# Patient Record
Sex: FEMALE | Race: BLACK OR AFRICAN AMERICAN | Employment: FULL TIME | URBAN - METROPOLITAN AREA
[De-identification: names, ages, dates, MRNs, and addresses within clinical notes are randomized per-mention and may not be internally consistent; named-entity substitution may affect disease eponyms.]

---

## 2017-09-26 ENCOUNTER — OFFICE VISIT (OUTPATIENT)
Dept: URGENT CARE | Facility: URGENT CARE | Age: 27
End: 2017-09-26
Payer: COMMERCIAL

## 2017-09-26 VITALS
BODY MASS INDEX: 34.02 KG/M2 | SYSTOLIC BLOOD PRESSURE: 154 MMHG | WEIGHT: 214 LBS | OXYGEN SATURATION: 95 % | TEMPERATURE: 98.8 F | HEART RATE: 90 BPM | DIASTOLIC BLOOD PRESSURE: 104 MMHG

## 2017-09-26 DIAGNOSIS — R30.0 DYSURIA: ICD-10-CM

## 2017-09-26 DIAGNOSIS — M25.561 ACUTE PAIN OF RIGHT KNEE: ICD-10-CM

## 2017-09-26 DIAGNOSIS — K52.9 GASTROENTERITIS: Primary | ICD-10-CM

## 2017-09-26 LAB
ALBUMIN UR-MCNC: NEGATIVE MG/DL
APPEARANCE UR: CLEAR
BACTERIA #/AREA URNS HPF: ABNORMAL /HPF
BILIRUB UR QL STRIP: NEGATIVE
COLOR UR AUTO: YELLOW
GLUCOSE UR STRIP-MCNC: NEGATIVE MG/DL
HGB UR QL STRIP: NEGATIVE
KETONES UR STRIP-MCNC: NEGATIVE MG/DL
LEUKOCYTE ESTERASE UR QL STRIP: NEGATIVE
NITRATE UR QL: NEGATIVE
NON-SQ EPI CELLS #/AREA URNS LPF: ABNORMAL /LPF
PH UR STRIP: 7 PH (ref 5–7)
RBC #/AREA URNS AUTO: ABNORMAL /HPF
SOURCE: ABNORMAL
SP GR UR STRIP: 1.01 (ref 1–1.03)
UROBILINOGEN UR STRIP-ACNC: 0.2 EU/DL (ref 0.2–1)
WBC #/AREA URNS AUTO: ABNORMAL /HPF

## 2017-09-26 PROCEDURE — 99203 OFFICE O/P NEW LOW 30 MIN: CPT | Performed by: PHYSICIAN ASSISTANT

## 2017-09-26 PROCEDURE — 81001 URINALYSIS AUTO W/SCOPE: CPT | Performed by: PHYSICIAN ASSISTANT

## 2017-09-26 RX ORDER — ONDANSETRON 4 MG/1
4 TABLET, FILM COATED ORAL EVERY 8 HOURS PRN
Qty: 18 TABLET | Refills: 0 | Status: SHIPPED | OUTPATIENT
Start: 2017-09-26

## 2017-09-26 RX ORDER — NAPROXEN 500 MG/1
500 TABLET ORAL 2 TIMES DAILY WITH MEALS
Qty: 60 TABLET | Refills: 0 | Status: SHIPPED | OUTPATIENT
Start: 2017-09-26

## 2017-09-26 NOTE — MR AVS SNAPSHOT
"              After Visit Summary   9/26/2017    Celsa Clay    MRN: [id number]           Patient Information     Date Of Birth          1990        Visit Information        Provider Department      9/26/2017 7:10 PM Ankita Willingham PA-C [facility]        Today's Diagnoses     Gastroenteritis    -  1    Dysuria        Acute pain of right knee          Care Instructions    (K52.9) Gastroenteritis  (primary encounter diagnosis)  Comment:   Plan: ondansetron (ZOFRAN) 4 MG tablet        [place] diet.  Follow up with primary clinic should symptoms persist or worsen.    (R30.0) Dysuria  Comment:   Plan: UA with Microscopic reflex to Culture            (M25.561) Acute pain of right knee  Comment:   Plan:naproxen (NAPROSYN) 500 MG tablet        Ace wrap as needed.                Follow-ups after your visit        Who to contact     If you have questions or need follow up information about today's clinic visit or your schedule please contact [facility] directly at [phone].  Normal or non-critical lab and imaging results will be communicated to you by [email or web address], letter or phone within 4 business days after the clinic has received the results. If you do not hear from us within 7 days, please contact the clinic through [email or web address] or phone. If you have a critical or abnormal lab result, we will notify you by phone as soon as possible.  Submit refill requests through OneAway or call your pharmacy and they will forward the refill request to us. Please allow 3 business days for your refill to be completed.          Additional Information About Your Visit        MyChart Information     OneAway lets you send messages to your doctor, view your test results, renew your prescriptions, schedule appointments and more. To sign up, go to [email or web address] . Click on \"Log in\" on the left side of the screen, which will take you to the Welcome page. Then " "click on \"Sign up Now\" on the right side of the page.     You will be asked to enter the access code listed below, as well as some personal information. Please follow the directions to create your username and password.     Your access code is: [email or web address]  Expires: 2017  8:29 PM     Your access code will  in 90 days. If you need help or a new code, please call your [place] clinic or [phone].        Care EveryWhere ID     This is your Care EveryWhere ID. This could be used by other organizations to access your [place] medical records  [id number]        Your Vitals Were     Pulse Temperature Last Period Pulse Oximetry Breastfeeding? BMI (Body Mass Index)    90 98.8  F (37.1  C) (Oral) 2017 95% No 34.02 kg/m2       Blood Pressure from Last 3 Encounters:   17 (!) 154/104   14 132/79   11 137/76    Weight from Last 3 Encounters:   17 214 lb (97.1 kg)   11 209 lb 11.2 oz (95.1 kg)              We Performed the Following     UA with Microscopic reflex to Culture          Today's Medication Changes          These changes are accurate as of: 17  8:29 PM.  If you have any questions, ask your nurse or doctor.               Start taking these medicines.        Dose/Directions    naproxen 500 MG tablet   Commonly known as:  NAPROSYN   Used for:  Acute pain of right knee   Started by:  Ankita Willingham PA-C        Dose:  500 mg   Take 1 tablet (500 mg) by mouth 2 times daily (with meals)   Quantity:  60 tablet   Refills:  0         These medicines have changed or have updated prescriptions.        Dose/Directions    * ZOFRAN PO   This may have changed:  Another medication with the same name was added. Make sure you understand how and when to take each.        Dose:  4 mg   Take 4 mg by mouth every 6 hours as needed   Refills:  0       * ondansetron 4 MG tablet   Commonly known as:  ZOFRAN   This may have changed:  You were already taking a medication with " the same name, and this prescription was added. Make sure you understand how and when to take each.   Used for:  Gastroenteritis   Changed by:  Ankita Willingham PA-C        Dose:  4 mg   Take 1 tablet (4 mg) by mouth every 8 hours as needed   Quantity:  18 tablet   Refills:  0       * Notice:  This list has 2 medication(s) that are the same as other medications prescribed for you. Read the directions carefully, and ask your doctor or other care provider to review them with you.         Where to get your medicines      These medications were sent to [facility] [id number] [place] [address] [address] AT [facility] [address]  [address], [facility] [address]    Hours:  24-hours Phone:  [phone]     naproxen 500 MG tablet    ondansetron 4 MG tablet                Primary Care Provider    None Specified       No primary provider on file.        Equal Access to Services     [facility] : Hadii olga camposo Sorachell, waaxda luqadaha, qaybta kaalmada adeegyada, akiko sandhu haynayeli rutledge . So [facility] [phone].    ATENCIÓN: Si habla español, tiene a farfan disposición servicios gratuitos de asistencia lingüística. Llame al [phone].    We comply with applicable federal civil rights laws and [place] laws. We do not discriminate on the basis of race, color, national origin, age, disability sex, sexual orientation or gender identity.            Thank you!     Thank you for choosing [facility]  for your care. Our goal is always to provide you with excellent care. Hearing back from our patients is one way we can continue to improve our services. Please take a few minutes to complete the written survey that you may receive in the mail after your visit with us. Thank you!             Your Updated Medication List - Protect others around you: Learn how to safely use, store and throw away your medicines at www.disposemymeds.org.          This list  is accurate as of: 9/26/17  8:29 PM.  Always use your most recent med list.                   Brand Name Dispense Instructions for use Diagnosis    amLODIPine 10 MG tablet    NORVASC    90 tablet    Take 1 tablet by mouth daily.    Hypertension       buPROPion 300 MG 24 hr tablet    WELLBUTRIN XL    90 tablet    Take 1 tablet by mouth every morning.    Tobacco use disorder, Mild major depression (H)       naproxen 500 MG tablet    NAPROSYN    60 tablet    Take 1 tablet (500 mg) by mouth 2 times daily (with meals)    Acute pain of right knee       omeprazole 20 MG tablet     90 tablet    Take 20 mg by mouth daily.    Epigastric pain       polyethylene glycol powder    MIRALAX    510 g    Take 17 g by mouth daily.    Constipation       prenatal multivitamin plus iron 27-0.8 MG Tabs per tablet      Take 1 tablet by mouth daily        * ZOFRAN PO      Take 4 mg by mouth every 6 hours as needed        * ondansetron 4 MG tablet    ZOFRAN    18 tablet    Take 1 tablet (4 mg) by mouth every 8 hours as needed    Gastroenteritis       * Notice:  This list has 2 medication(s) that are the same as other medications prescribed for you. Read the directions carefully, and ask your doctor or other care provider to review them with you.

## 2017-09-26 NOTE — LETTER
[facility]  [facility]    [address] [address]  Phone: [phone]    September 26, 2017      RE: Celsa Clay  [address] [place] [id number]       To whom it may concern:    Celsa Clay was seen in our clinic today. She  may return to work with the following: No restrictions on or about 09/28/2017.    Sincerely,      Ankita Lau PA-C

## 2017-09-27 NOTE — PATIENT INSTRUCTIONS
(K52.9) Gastroenteritis  (primary encounter diagnosis)  Comment:   Plan: ondansetron (ZOFRAN) 4 MG tablet        [place] diet.  Follow up with primary clinic should symptoms persist or worsen.    (R30.0) Dysuria  Comment:   Plan: UA with Microscopic reflex to Culture            (M25.561) Acute pain of right knee  Comment:   Plan:naproxen (NAPROSYN) 500 MG tablet        Ace wrap as needed.

## 2017-09-27 NOTE — NURSING NOTE
"Chief Complaint   Patient presents with     Urgent Care     pt states stomach pain, diarrhea, dehydrated, headache x this morning      Knee Pain     pt states right knee pain and fires sensation 3x days        Initial BP (!) 154/104  Pulse 90  Temp 98.8  F (37.1  C) (Oral)  Wt 214 lb (97.1 kg)  SpO2 95%  BMI 34.02 kg/m2 Estimated body mass index is 34.02 kg/(m^2) as calculated from the following:    Height as of 9/22/11: 5' 6.5\" (1.689 m).    Weight as of this encounter: 214 lb (97.1 kg).  Medication Reconciliation: complete    "

## 2017-09-27 NOTE — PROGRESS NOTES
SUBJECTIVE:   Celsa is a 27 year old female  who is here for:  1) diarrhea.Onset was this morning.,  She has had 5 episodes.  No blood.   Ate tacos last night for dinner.  No ill contacts.    Some nausea, no vomiting.    NO fevers.   Mild headache.      No one else in the family has symptoms NO  Has patient ate outside the home or ate left overs NO  Has patient been on antibiotics NO    Denies any runny nose, congestion or cough  Denies any urinary frequency or dysuria    2) Right knee pain for 3 days.  No trauma.    No history of knee pain.   Pain is worse with going up and down stairs.          Current Outpatient Prescriptions:      buPROPion (WELLBUTRIN XL) 300 MG 24 hr tablet, Take 1 tablet by mouth every morning., Disp: 90 tablet, Rfl: 1     Prenatal Vit-Fe Fumarate-FA (PRENATAL MULTIVITAMIN  PLUS IRON) 27-0.8 MG TABS, Take 1 tablet by mouth daily, Disp: , Rfl:      Ondansetron HCl (ZOFRAN PO), Take 4 mg by mouth every 6 hours as needed, Disp: , Rfl:      polyethylene glycol (MIRALAX) powder, Take 17 g by mouth daily. (Patient not taking: Reported on 9/26/2017), Disp: 510 g, Rfl: 1     amLODIPine (NORVASC) 10 MG tablet, Take 1 tablet by mouth daily. (Patient not taking: Reported on 9/26/2017), Disp: 90 tablet, Rfl: 1     Omeprazole 20 MG tablet, Take 20 mg by mouth daily. (Patient not taking: Reported on 9/26/2017), Disp: 90 tablet, Rfl: 1    No Known Allergies    Past Medical History:   Diagnosis Date     Hypertension     preeclampsia with first         REVIEW OF SYSTEMS  CONSTITUTIONAL:NEGATIVE  ENT/MOUTH: NEGATIVE  RESP: NEGATIVE  CV: NEGATIVE  GI: as per HPI  : NEGATIVE  MUSCULOSKELATAL: NEGATIVE  INTEGUMENTARY/SKIN: NEGATIVE    OBJECTIVE:    BP (!) 154/104  Pulse 90  Temp 98.8  F (37.1  C) (Oral)  Wt 214 lb (97.1 kg)  SpO2 95%  BMI 34.02 kg/m2  GENERAL: healthy, no distress    SKIN: Skin color, texture, turgor normal. No rashes or lesions.    HEENT: extraocular movements are intact, pupils  equal and reactive to light and accommodation, TMs clear, oropharynx clear, nasal passages clear; not tender    NECK: supple    HEART: regular rate and rhythm and no murmurs, clicks, or gallops    LUNGS: clear to auscultation    ABDOMEN:  positive bowel sounds, soft, nontender      (K52.9) Gastroenteritis  (primary encounter diagnosis)  Comment:   Plan: ondansetron (ZOFRAN) 4 MG tablet        [place] diet.  Follow up with primary clinic should symptoms persist or worsen.    (R30.0) Dysuria  Comment:   Plan: UA with Microscopic reflex to Culture            (M25.561) Acute pain of right knee  Comment:   Plan:naproxen (NAPROSYN) 500 MG tablet        Ace wrap as needed.

## 2019-08-17 ENCOUNTER — HOSPITAL ENCOUNTER (EMERGENCY)
Facility: CLINIC | Age: 29
Discharge: HOME | End: 2019-08-17
Attending: EMERGENCY MEDICINE | Admitting: EMERGENCY MEDICINE
Payer: COMMERCIAL

## 2019-08-17 VITALS
SYSTOLIC BLOOD PRESSURE: 138 MMHG | HEIGHT: 67 IN | WEIGHT: 195 LBS | BODY MASS INDEX: 30.61 KG/M2 | OXYGEN SATURATION: 100 % | DIASTOLIC BLOOD PRESSURE: 87 MMHG | TEMPERATURE: 97.9 F | RESPIRATION RATE: 16 BRPM

## 2019-08-17 DIAGNOSIS — N76.0 BACTERIAL VAGINOSIS: ICD-10-CM

## 2019-08-17 DIAGNOSIS — B96.89 BACTERIAL VAGINOSIS: ICD-10-CM

## 2019-08-17 LAB
ALBUMIN UR-MCNC: 10 MG/DL
APPEARANCE UR: CLEAR
BILIRUB UR QL STRIP: NEGATIVE
COLOR UR AUTO: YELLOW
GLUCOSE UR STRIP-MCNC: NEGATIVE MG/DL
HGB UR QL STRIP: NEGATIVE
KETONES UR STRIP-MCNC: NEGATIVE MG/DL
LEUKOCYTE ESTERASE UR QL STRIP: ABNORMAL
MUCOUS THREADS #/AREA URNS LPF: PRESENT /LPF
NITRATE UR QL: NEGATIVE
PH UR STRIP: 6.5 PH (ref 5–7)
RBC #/AREA URNS AUTO: <1 /HPF (ref 0–2)
SOURCE: ABNORMAL
SP GR UR STRIP: 1.03 (ref 1–1.03)
SPECIMEN SOURCE: ABNORMAL
SQUAMOUS #/AREA URNS AUTO: 1 /HPF (ref 0–1)
UROBILINOGEN UR STRIP-MCNC: NORMAL MG/DL (ref 0–2)
WBC #/AREA URNS AUTO: <1 /HPF (ref 0–5)
WET PREP SPEC: ABNORMAL

## 2019-08-17 PROCEDURE — 87591 N.GONORRHOEAE DNA AMP PROB: CPT | Performed by: EMERGENCY MEDICINE

## 2019-08-17 PROCEDURE — 99284 EMERGENCY DEPT VISIT MOD MDM: CPT

## 2019-08-17 PROCEDURE — 87210 SMEAR WET MOUNT SALINE/INK: CPT | Performed by: EMERGENCY MEDICINE

## 2019-08-17 PROCEDURE — 81001 URINALYSIS AUTO W/SCOPE: CPT | Performed by: EMERGENCY MEDICINE

## 2019-08-17 PROCEDURE — 87491 CHLMYD TRACH DNA AMP PROBE: CPT | Performed by: EMERGENCY MEDICINE

## 2019-08-17 RX ORDER — METRONIDAZOLE 500 MG/1
500 TABLET ORAL 4 TIMES DAILY
Qty: 28 TABLET | Refills: 0 | Status: SHIPPED | OUTPATIENT
Start: 2019-08-17 | End: 2019-08-24

## 2019-08-17 ASSESSMENT — MIFFLIN-ST. JEOR: SCORE: 1642.14

## 2019-08-17 NOTE — ED AVS SNAPSHOT
Emergency Department  [address] [place] [address]  Phone:  [phone]  Fax:  [phone]                                    Celsa Clay   MRN: [id number]    Department:   Emergency Department   Date of Visit:  8/17/2019           After Visit Summary Signature Page    I have received my discharge instructions, and my questions have been answered. I have discussed any challenges I see with this plan with the nurse or doctor.    ..........................................................................................................................................  Patient/Patient Representative Signature      ..........................................................................................................................................  Patient Representative Print Name and Relationship to Patient    ..................................................               ................................................  Date                                   Time    ..........................................................................................................................................  Reviewed by Signature/Title    ...................................................              ..............................................  Date                                               Time          22EPIC Rev 08/18

## 2019-08-18 LAB
C TRACH DNA SPEC QL NAA+PROBE: NEGATIVE
N GONORRHOEA DNA SPEC QL NAA+PROBE: NEGATIVE
SPECIMEN SOURCE: NORMAL
SPECIMEN SOURCE: NORMAL

## 2019-08-18 NOTE — ED PROVIDER NOTES
"  History     Chief Complaint:  Vaginal Itching    HPI   Celsa Clay is a 29 year old female who presents to the ED for evaluation of vaginal itching. The patient reports experiencing vaginal itching, discharge and odor for the past three weeks. She reports that her last time having intercourse was about 3 weeks ago as well. The patient states that she thought she could solve this problem on her own by drinking water and cranberry juice, but her symptoms did not improve prompting her presentation to the ED. The patient denies any antibiotic use.    Allergies:  The patient has no known drug allergies.    Medications:    Norvasc  Wellbutrin  Naprosyn   Omeprazole  Zofran  Miralax  Prenatal vitamins    Past Medical History:    Hypertension  Tenosynovitis  Tobacco use disorder  Mild major depression    Past Surgical History:     Section    Family History:    Hypertension, mother and father  Diabetes, mother  Seizures, sister    Social History:  Former smoker.  Negative for alcohol use.  Negative for drug use.  Marital Status:  Single [1]     Review of Systems   Genitourinary: Positive for vaginal discharge.        Vaginal itching and odor   All other systems reviewed and are negative.        Physical Exam     Patient Vitals for the past 24 hrs:   BP Temp Temp src Heart Rate Resp SpO2 Height Weight   19 2148 138/87 97.9  F (36.6  C) Oral 86 16 100 % 1.702 m (5' 7\") 88.5 kg (195 lb)     Physical Exam  Vitals: reviewed by me  General: Pt seen on [facility], cooperative, and alert to conversation  Eyes: Tracking well, clear conjunctiva BL  ENT: MMM, midline trachea.   Lungs: No tachypnea, no accessory muscle use. No respiratory distress.   CV: Rate as above, regular rhythm.    Abd: Soft, non tender, no guarding, no rebound. Non distended  Pelvic exam done with ER chaperone-normal external exam, does have a significant amount of vaginal discharge coming out of the cervix.  No CMT, no " lesions noted.  MSK: no peripheral edema or joint effusion.  No evidence of trauma  Skin: No rash, normal turgor and temperature  Neuro: Clear speech and no facial droop.  Psych: Not RIS, no e/o AH/VH      Emergency Department Course   Laboratory:  UA with Microscopic: Protein albumin: 10, Leukocyte esterase: small, Mucous: present, o/w WNL    Wet prep: clue cells seen  Chlamydia trachomatis PCR: pending  Neisseria gonorrhoea PCR: pending    Emergency Department Course:  Nursing notes and vitals reviewed. (6522) I performed an exam of the patient as documented above.     IV inserted. Medicine administered as documented above. Blood drawn. Urine collected. This was sent to the lab for further testing, results above.     3793 I rechecked the patient and discussed the results of her workup thus far.     Findings and plan explained to the Patient. Patient discharged home with instructions regarding supportive care, medications, and reasons to return. The importance of close follow-up was reviewed. The patient was prescribed Metronidazole.    Impression & Plan    Medical Decision Making:  Celsa Clay is a 29 year old female who presents to the Emergency Room with what appears to be bacterial vaginosis. Patient certainly is at risk for gonorrhoea and chlamydia, but stated she does use protection, and without a compelling alternative diagnosis. Will give Flagyl and have patient follow with her primary care doctor in the coming days. No evidence of PID, no cervicitis on my exam, no cervical motion tenderness, will discharge as above.    Diagnosis:    ICD-10-CM   1. Bacterial vaginosis N76.0    B96.89       Disposition:  The patient was discharged to home.    Discharge Medications:  New Prescriptions    METRONIDAZOLE (FLAGYL) 500 MG TABLET    Take 1 tablet (500 mg) by mouth 4 times daily for 7 days     Scribe Disclosure:  INicole, am serving as a scribe on 8/17/2019 at 10:07 PM to personally document  services performed by Brayan Gayle MD based on my observations and the provider's statements to me.     Nicole Shrestha  8/17/2019    EMERGENCY DEPARTMENT       Brayan Gayle MD  08/18/19 0351

## 2022-02-08 ENCOUNTER — LAB REQUISITION (OUTPATIENT)
Dept: LAB | Facility: CLINIC | Age: 32
End: 2022-02-08

## 2022-02-08 PROCEDURE — 86706 HEP B SURFACE ANTIBODY: CPT | Performed by: INTERNAL MEDICINE

## 2022-02-08 PROCEDURE — 86481 TB AG RESPONSE T-CELL SUSP: CPT | Performed by: INTERNAL MEDICINE

## 2022-02-08 PROCEDURE — 86787 VARICELLA-ZOSTER ANTIBODY: CPT | Performed by: INTERNAL MEDICINE

## 2022-02-09 LAB
HBV SURFACE AB SERPL IA-ACNC: >1000 M[IU]/ML
VZV IGG SER QL IA: 876.2 INDEX
VZV IGG SER QL IA: POSITIVE

## 2022-02-10 LAB
GAMMA INTERFERON BACKGROUND BLD IA-ACNC: 0.05 IU/ML
M TB IFN-G BLD-IMP: NEGATIVE
M TB IFN-G CD4+ BCKGRND COR BLD-ACNC: 4.61 IU/ML
MITOGEN IGNF BCKGRD COR BLD-ACNC: 0 IU/ML
MITOGEN IGNF BCKGRD COR BLD-ACNC: 0 IU/ML
QUANTIFERON MITOGEN: 4.66 IU/ML
QUANTIFERON NIL TUBE: 0.05 IU/ML
QUANTIFERON TB1 TUBE: 0.05 IU/ML
QUANTIFERON TB2 TUBE: 0.05

## 2022-03-14 ENCOUNTER — APPOINTMENT (OUTPATIENT)
Dept: GENERAL RADIOLOGY | Facility: CLINIC | Age: 32
End: 2022-03-14
Attending: EMERGENCY MEDICINE
Payer: COMMERCIAL

## 2022-03-14 ENCOUNTER — HOSPITAL ENCOUNTER (EMERGENCY)
Facility: CLINIC | Age: 32
Discharge: HOME | End: 2022-03-14
Attending: EMERGENCY MEDICINE | Admitting: EMERGENCY MEDICINE
Payer: COMMERCIAL

## 2022-03-14 VITALS
SYSTOLIC BLOOD PRESSURE: 167 MMHG | TEMPERATURE: 97.2 F | OXYGEN SATURATION: 100 % | RESPIRATION RATE: 16 BRPM | WEIGHT: 213.6 LBS | BODY MASS INDEX: 33.45 KG/M2 | HEART RATE: 78 BPM | DIASTOLIC BLOOD PRESSURE: 117 MMHG

## 2022-03-14 DIAGNOSIS — D64.9 ANEMIA, UNSPECIFIED TYPE: ICD-10-CM

## 2022-03-14 DIAGNOSIS — R06.02 SOB (SHORTNESS OF BREATH): Primary | ICD-10-CM

## 2022-03-14 DIAGNOSIS — R06.00 DYSPNEA, UNSPECIFIED TYPE: ICD-10-CM

## 2022-03-14 DIAGNOSIS — F41.9 ANXIETY: ICD-10-CM

## 2022-03-14 LAB
ANION GAP SERPL CALCULATED.3IONS-SCNC: 6 MMOL/L (ref 3–14)
BASOPHILS # BLD AUTO: 0 10E3/UL (ref 0–0.2)
BASOPHILS NFR BLD AUTO: 0 %
BUN SERPL-MCNC: 11 MG/DL (ref 7–30)
CALCIUM SERPL-MCNC: 8.2 MG/DL (ref 8.5–10.1)
CHLORIDE BLD-SCNC: 110 MMOL/L (ref 94–109)
CO2 SERPL-SCNC: 23 MMOL/L (ref 20–32)
CREAT SERPL-MCNC: 0.68 MG/DL (ref 0.52–1.04)
EOSINOPHIL # BLD AUTO: 0.1 10E3/UL (ref 0–0.7)
EOSINOPHIL NFR BLD AUTO: 2 %
ERYTHROCYTE [DISTWIDTH] IN BLOOD BY AUTOMATED COUNT: 20.1 % (ref 10–15)
FLUAV RNA SPEC QL NAA+PROBE: NEGATIVE
FLUBV RNA RESP QL NAA+PROBE: NEGATIVE
GFR SERPL CREATININE-BSD FRML MDRD: >90 ML/MIN/1.73M2
GLUCOSE BLD-MCNC: 82 MG/DL (ref 70–99)
HCT VFR BLD AUTO: 25.7 % (ref 35–47)
HGB BLD-MCNC: 7.5 G/DL (ref 11.7–15.7)
IMM GRANULOCYTES # BLD: 0 10E3/UL
IMM GRANULOCYTES NFR BLD: 1 %
LYMPHOCYTES # BLD AUTO: 2.1 10E3/UL (ref 0.8–5.3)
LYMPHOCYTES NFR BLD AUTO: 48 %
MCH RBC QN AUTO: 20.3 PG (ref 26.5–33)
MCHC RBC AUTO-ENTMCNC: 29.2 G/DL (ref 31.5–36.5)
MCV RBC AUTO: 70 FL (ref 78–100)
MONOCYTES # BLD AUTO: 0.3 10E3/UL (ref 0–1.3)
MONOCYTES NFR BLD AUTO: 7 %
NEUTROPHILS # BLD AUTO: 1.8 10E3/UL (ref 1.6–8.3)
NEUTROPHILS NFR BLD AUTO: 42 %
NRBC # BLD AUTO: 0 10E3/UL
NRBC BLD AUTO-RTO: 0 /100
NT-PROBNP SERPL-MCNC: 98 PG/ML (ref 0–450)
PLATELET # BLD AUTO: 285 10E3/UL (ref 150–450)
POTASSIUM BLD-SCNC: 3.6 MMOL/L (ref 3.4–5.3)
RBC # BLD AUTO: 3.69 10E6/UL (ref 3.8–5.2)
SARS-COV-2 RNA RESP QL NAA+PROBE: NEGATIVE
SODIUM SERPL-SCNC: 139 MMOL/L (ref 133–144)
TROPONIN I SERPL HS-MCNC: <3 NG/L
WBC # BLD AUTO: 4.3 10E3/UL (ref 4–11)

## 2022-03-14 PROCEDURE — 93010 ELECTROCARDIOGRAM REPORT: CPT | Performed by: EMERGENCY MEDICINE

## 2022-03-14 PROCEDURE — 83880 ASSAY OF NATRIURETIC PEPTIDE: CPT | Performed by: EMERGENCY MEDICINE

## 2022-03-14 PROCEDURE — 87636 SARSCOV2 & INF A&B AMP PRB: CPT | Performed by: EMERGENCY MEDICINE

## 2022-03-14 PROCEDURE — 82310 ASSAY OF CALCIUM: CPT | Performed by: EMERGENCY MEDICINE

## 2022-03-14 PROCEDURE — 99285 EMERGENCY DEPT VISIT HI MDM: CPT | Mod: 25 | Performed by: EMERGENCY MEDICINE

## 2022-03-14 PROCEDURE — 250N000013 HC RX MED GY IP 250 OP 250 PS 637: Performed by: EMERGENCY MEDICINE

## 2022-03-14 PROCEDURE — C9803 HOPD COVID-19 SPEC COLLECT: HCPCS | Performed by: EMERGENCY MEDICINE

## 2022-03-14 PROCEDURE — 84484 ASSAY OF TROPONIN QUANT: CPT | Performed by: EMERGENCY MEDICINE

## 2022-03-14 PROCEDURE — 85025 COMPLETE CBC W/AUTO DIFF WBC: CPT | Performed by: EMERGENCY MEDICINE

## 2022-03-14 PROCEDURE — 71045 X-RAY EXAM CHEST 1 VIEW: CPT

## 2022-03-14 PROCEDURE — 36415 COLL VENOUS BLD VENIPUNCTURE: CPT | Performed by: EMERGENCY MEDICINE

## 2022-03-14 PROCEDURE — 93005 ELECTROCARDIOGRAM TRACING: CPT | Performed by: EMERGENCY MEDICINE

## 2022-03-14 PROCEDURE — 90791 PSYCH DIAGNOSTIC EVALUATION: CPT

## 2022-03-14 RX ORDER — HYDROXYZINE HYDROCHLORIDE 50 MG/1
50 TABLET, FILM COATED ORAL ONCE
Status: COMPLETED | OUTPATIENT
Start: 2022-03-14 | End: 2022-03-14

## 2022-03-14 RX ADMIN — HYDROXYZINE HYDROCHLORIDE 50 MG: 50 TABLET, FILM COATED ORAL at 15:29

## 2022-03-14 ASSESSMENT — ENCOUNTER SYMPTOMS
SHORTNESS OF BREATH: 1
ABDOMINAL PAIN: 1
NERVOUS/ANXIOUS: 1
COUGH: 1
HALLUCINATIONS: 0
BACK PAIN: 1
FATIGUE: 1
FEVER: 0

## 2022-03-14 NOTE — ED TRIAGE NOTES
Pt believes she is having shortness of breath due to anxiety.  She is currently menstrating and notices that increased anxiety during this time.  SOB doesn't change with exertion.  States she has had SOB since Friday 3/11

## 2022-03-14 NOTE — DISCHARGE INSTRUCTIONS
Please follow up with psychiatry as scheduled.     Please return to emergency department if you have any suicidal thoughts, worsening anxiety, any other concerns.    Please make an appointment to follow up with your OB/Gyn clinic as soon as possible for evaluation of your anemia and your heavy menstrual periods.  Please return the emergency department if you have heavy vaginal bleeding, passing clots from the vagina, if you are soaking more than 1 pad per hour for 2 hours neuro, if you have any other concerns.  Take iron supplements with ferrous sulfate in them every day.

## 2022-03-14 NOTE — Clinical Note
"1) Warning Signs.   Any thoughts of self harm or suicide, worsening anxiety symptoms, worsening shortness of breath.  2) Things that make me feel better.    - Things I can do to take my mind off my problems without contacting another person  What healthy  actions can I take to make myself feel better?   Ask family and friends for help, take care of myself, get enough sleep, eat well, exercise, use positive coping skills  3) People and social settings that provide distraction.   Family, kids, friends  4)  People whom I can ask for help.  Family, friends, co-workers, kids' dad  5) Professionals or agencies I can contact during a crisis.   Therapist, psychiatrist    National Suicide Prevention Lifeline  3.732.905.9230    Crisis Text Line  Text \"[place]\" to  7417 41    Warmline  001.177.7615 or text \"Support\" to 99170   The [place] Warmline provides a wxto-dx-duhq approach to mental health recovery, support and wellness.   Mon-Sat- 5pm-10pm.     Date: Thursday, 3/24/2022  Time: 8:30 am - 9:30 am  Provider: Gillian Waters  MSN  CNP,PMHNP,RN  Location: [facility], [address] [place] [address], [address], [place] [address]  Phone: [phone]  Type: Medication Mgmt - Initial (In-Person)    Scheduling Instructions  THERE HAS TO BE CORRECT INSURANCE  AND CORRECT PHONE NUMBER TO SCHEDULE. Age Range is 18-72- NO EXCEPTIONS!! NO DEMENTIA, ALZHEIMER'S OR PARKINSON'S! NO ACTIVE SUBSTANCE USE! If language other than English will need to supply their own .Patients must live in [place].  Patient Instr uctions  Please arrive 10-15 minutes prior to your scheduled appointment time. Also, please bring the following items: Insurance Card and 's license. Thank you.  Other Information  (none)  Scheduled By: Yodit Alonso  Scheduled On: 3/14/2022 1:56  pm"

## 2022-03-14 NOTE — PROGRESS NOTES
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 Patient encouraged to leave clinic while awaiting test results. COVID test order placed and patient encouraged to follow up with her PCP.     Swapna Dean MD
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
## 2022-03-14 NOTE — ED PROVIDER NOTES
"    [facility] EMERGENCY DEPARTMENT ([facility])  3/14/22  History     Chief Complaint   Patient presents with     Shortness of Breath     Anxiety     The history is provided by the patient.     Celsa Clay is a 32 year old female who has a significant medical history of HTN, de Quervain's disease, anxiety, MDD, and smoker who presents to the Emergency Department with c/o chest pain and shortness of breath that she attributes to anxiety.  She states that she is currently menstruating, she reports having history of presenting symptoms for approximately 15 years, symptoms have always manifested while she is menstruating. She reports that her chest pain radiates towards her back intermittently, she has been experiencing this intermittent chest pain for \"the last few months.\"  Here in the ED, she reports experiencing shortness of breath, but the chest pain has ceased. She denies experiencing recent fall or trauma to her back. She reports experiencing some coughing and fatigue, but denies experiencing fever.  She states that she may have had a \"cold\" 5 days ago, but attributes this to seasonal allergies.  She denies chance of being infected with Covid, vaccinated for Covid x2. She reports experiencing mild abdominal pain today from menstrual cramping. She reports experiencing leg swelling, this leg swelling usually happens when she is menstruating. She reports experiencing some nausea here in the ED, but attributes this to resuming her anxiety medication. She denies experiencing SI, AH, or VH.  Denies having established psychiatric care to manage her anxiety medication. She states that her anxiety may be due to adjusting to her new schedule, she recently started going to school while working being a parent of 2.  She reports having an established therapist, she also states that she has been diagnosed with anxiety and prescribed medication (chart indicates that this medication is Wellbutrin  mg).  She " "states she stopped taking her anxiety medication for approximately 3 months, she resumed taking this medication today. She states that she has had several stressors in the past \"may have caused PTSD.\" She denies having history of cardiac disease. She states her last EKG was in . She reports having history of \"blood clots\" when she was first prescribed birth control, she states that her provider discontinued the birth control prescription.    Past Medical History  Past Medical History:   Diagnosis Date     Hypertension     preeclampsia with first     Past Surgical History:   Procedure Laterality Date      SECTION       buPROPion (WELLBUTRIN XL) 300 MG 24 hr tablet  amLODIPine (NORVASC) 10 MG tablet  naproxen (NAPROSYN) 500 MG tablet  Omeprazole 20 MG tablet  ondansetron (ZOFRAN) 4 MG tablet  Ondansetron HCl (ZOFRAN PO)  polyethylene glycol (MIRALAX) powder  Prenatal Vit-Fe Fumarate-FA (PRENATAL MULTIVITAMIN  PLUS IRON) 27-0.8 MG TABS      No Known Allergies  Family History  Family History   Problem Relation Age of Onset     Hypertension Mother      Hypertension Father      Social History   Social History     Tobacco Use     Smoking status: Former Smoker     Packs/day: 0.30     Years: 5.00     Pack years: 1.50     Types: Cigarettes     Smokeless tobacco: Never Used   Substance Use Topics     Alcohol use: No     Drug use: No      Past medical history, past surgical history, medications, allergies, family history, and social history were reviewed with the patient. No additional pertinent items.     I have reviewed the Medications, Allergies, Past Medical and Surgical History, and Social History in the Epic system.    Review of Systems   Constitutional: Positive for fatigue. Negative for fever.   Respiratory: Positive for cough and shortness of breath.    Cardiovascular: Positive for chest pain and leg swelling.   Gastrointestinal: Positive for abdominal pain.   Musculoskeletal: Positive for back pain. "   Psychiatric/Behavioral: Negative for hallucinations and suicidal ideas. The patient is nervous/anxious.      A complete review of systems was performed with pertinent positives and negatives noted in the HPI, and all other systems negative.    Physical Exam   BP: (!) 159/106  Pulse: 80  Temp: 97.4  F (36.3  C)  Resp: 12  Weight: 96.9 kg (213 lb 9.6 oz)  SpO2: 100 %      Physical Exam  Vitals and nursing note reviewed.     GEN:  Alert, well developed, no acute distress  HEENT:  PERRL, EOMI, Mucous membranes are moist.   Cardio:  RRR, no murmur, radial pulses equal bilaterally  PULM:  Lungs clear, good air movement, no wheezes, rales   Abd:  Soft, normal bowel sounds, no focal tenderness  Back exam:  No CVA tenderness  Musculoskeletal: No chest wall tenderness to palpation, extremities have normal range of motion, there is mild bilateral lower extremity swelling without calf tenderness  Neuro:  Alert and oriented X3, Follows commands, moving all extremities spontaneously   Skin:  Warm, dry   Psychiatric: Normal mood and affect, reports feeling anxious, no suicidal ideation, no homicidal ideation, no auditory or visual hallucinations      ED Course     At 11:28 AM the patient was seen and examined by Marquita Yoo MD in Room ED08.        Procedures              EKG Interpretation:      Interpreted by Marquita Yoo MD  Time reviewed: 11:45  Symptoms at time of EKG: chest pain   Rhythm: normal sinus   Rate: normal  Axis: normal  Ectopy: none  Conduction: normal  ST Segments/ T Waves: No ST-T wave changes, early repolarization  Q Waves: none  Comparison to prior: No old EKG available    Clinical Impression: normal sinus rhythm with early repolarization    Labs were reviewed by me and results are shown below.        Results for orders placed or performed during the hospital encounter of 03/14/22 (from the past 24 hour(s))   EKG 12 lead   Result Value Ref Range    Systolic Blood Pressure  mmHg     Diastolic Blood Pressure  mmHg    Ventricular Rate 73 BPM    Atrial Rate 73 BPM    MO Interval 194 ms    QRS Duration 94 ms     ms    QTc 423 ms    P Axis 17 degrees    R AXIS 30 degrees    T Axis 21 degrees    Interpretation ECG Sinus rhythm  Early repolarization  Normal ECG      Symptomatic; Yes Influenza A/B & SARS-CoV2 (COVID-19) Virus PCR Multiplex Nasopharyngeal    Specimen: Nasopharyngeal; Swab   Result Value Ref Range    Influenza A PCR Negative Negative    Influenza B PCR Negative Negative    SARS CoV2 PCR Negative Negative    Narrative    Testing was performed using the jose SARS-CoV-2 & Influenza A/B Assay on the jose Tory System. This test should be ordered for the detection of SARS-CoV-2 and influenza viruses in individuals who meet clinical and/or epidemiological criteria. Test performance is unknown in asymptomatic patients. This test is for in vitro diagnostic use under the FDA EUA for laboratories certified under CLIA to perform moderate and/or high complexity testing. This test has not been FDA cleared or approved. A negative result does not rule out the presence of PCR inhibitors in the specimen or target RNA in concentration below the limit of detection for the assay. If only one viral target is positive but coinfection with multiple targets is suspected, the sample should be re-tested with another FDA cleared, approved or authorized test, if coinfection would change clinical management. [facility] are certified under the Clinical Laboratory Improvement Amendments of 1988 (CLIA-88) as  qualified to perform moderate and/or high complexity laboratory testing.   CBC with platelets differential    Narrative    The following orders were created for panel order CBC with platelets differential.  Procedure                               Abnormality         Status                     ---------                               -----------         ------                     CBC with  platelets and d...[[id number]]  Abnormal            Final result                 Please view results for these tests on the individual orders.   Basic metabolic panel   Result Value Ref Range    Sodium 139 133 - 144 mmol/L    Potassium 3.6 3.4 - 5.3 mmol/L    Chloride 110 (H) 94 - 109 mmol/L    Carbon Dioxide (CO2) 23 20 - 32 mmol/L    Anion Gap 6 3 - 14 mmol/L    Urea Nitrogen 11 7 - 30 mg/dL    Creatinine 0.68 0.52 - 1.04 mg/dL    Calcium 8.2 (L) 8.5 - 10.1 mg/dL    Glucose 82 70 - 99 mg/dL    GFR Estimate >90 >60 mL/min/1.73m2   Troponin I   Result Value Ref Range    Troponin I High Sensitivity <3 <54 ng/L   Nt probnp inpatient (BNP)   Result Value Ref Range    N terminal Pro BNP Inpatient 98 0 - 450 pg/mL   CBC with platelets and differential   Result Value Ref Range    WBC Count 4.3 4.0 - 11.0 10e3/uL    RBC Count 3.69 (L) 3.80 - 5.20 10e6/uL    Hemoglobin 7.5 (L) 11.7 - 15.7 g/dL    Hematocrit 25.7 (L) 35.0 - 47.0 %    MCV 70 (L) 78 - 100 fL    MCH 20.3 (L) 26.5 - 33.0 pg    MCHC 29.2 (L) 31.5 - 36.5 g/dL    RDW 20.1 (H) 10.0 - 15.0 %    Platelet Count 285 150 - 450 10e3/uL    % Neutrophils 42 %    % Lymphocytes 48 %    % Monocytes 7 %    % Eosinophils 2 %    % Basophils 0 %    % Immature Granulocytes 1 %    NRBCs per 100 WBC 0 <1 /100    Absolute Neutrophils 1.8 1.6 - 8.3 10e3/uL    Absolute Lymphocytes 2.1 0.8 - 5.3 10e3/uL    Absolute Monocytes 0.3 0.0 - 1.3 10e3/uL    Absolute Eosinophils 0.1 0.0 - 0.7 10e3/uL    Absolute Basophils 0.0 0.0 - 0.2 10e3/uL    Absolute Immature Granulocytes 0.0 <=0.4 10e3/uL    Absolute NRBCs 0.0 10e3/uL   XR Chest Port 1 View    Narrative    CHEST ONE VIEW PORTABLE   3/14/2022 12:57 PM     HISTORY: Shortness of breath.    COMPARISON: None available.      Impression    IMPRESSION: AP view of the chest was obtained. Cardiomediastinal  silhouette is within normal limits. No suspicious focal pulmonary  opacities. No significant pleural effusion or pneumothorax.     AHMAD  MD SACHI         SYSTEM ID:  [id number]     Medications   hydrOXYzine (ATARAX) tablet 50 mg (50 mg Oral Given 3/14/22 2223)         Patient was seen by the DEC  who was able to schedule outpatient psychiatry appointment for the patient.  Patient was given Atarax for her anxiety in the ED.    Assessments & Plan (with Medical Decision Making)   Celsa Clay is a 32 year old female who presents with shortness of breath and chest pain that she attributes to anxiety.  These are symptoms of the patient has had on and off for years.  She is PERC rule negative, doubt PE.  Negative troponin, normal EKG, doubt acute coronary syndrome.  The timing of this also would not be consistent with acute coronary syndrome.  Chest x-ray is negative for pneumothorax, pneumonia, pulmonary edema.  She does have anemia.  I spoke with the patient about her hemoglobin of 7.5.  She last had her hemoglobin checked in 2018 and it was 9.1.  Patient does report heavy menstrual periods.  She is currently on the last day of her period and she does not believe she is bleeding at all right now.  Explained to her that this low hemoglobin certainly could be contributing to her shortness of breath.  Patient states she really does not follow-up with a doctor in the last couple of years.  She has not been having any lightheadedness.  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  She was advised to return to the emergency department if she has any return of vaginal bleeding, any lightheadedness, worsening chest pain, shortness of breath, any other concerns.  Patient will follow up with outpatient psychiatry and will continue her therapy.  I also advised her to take iron supplements to help with her anemia.    I have reviewed the nursing notes.    I have reviewed the findings, diagnosis, plan and need for follow  up with the patient.    Discharge Medication List as of 3/14/2022  3:14 PM          Final diagnoses:   Anemia, unspecified type   Dyspnea, unspecified type   Anxiety       I, Link Jeffery am serving as a trained medical scribe to document services personally performed by Marquita Yoo, based on the provider's statements to me.      I, Marquita Yoo, was physically present and have reviewed and verified the accuracy of this note documented by Link Jeffery.     Marquita Yoo MD  3/14/2022   [facility] EMERGENCY DEPARTMENT     Marquita Yoo MD  03/14/22 3280

## 2022-03-15 LAB
ATRIAL RATE - MUSE: 73 BPM
DIASTOLIC BLOOD PRESSURE - MUSE: NORMAL MMHG
INTERPRETATION ECG - MUSE: NORMAL
P AXIS - MUSE: 17 DEGREES
PR INTERVAL - MUSE: 194 MS
QRS DURATION - MUSE: 94 MS
QT - MUSE: 384 MS
QTC - MUSE: 423 MS
R AXIS - MUSE: 30 DEGREES
SYSTOLIC BLOOD PRESSURE - MUSE: NORMAL MMHG
T AXIS - MUSE: 21 DEGREES
VENTRICULAR RATE- MUSE: 73 BPM

## 2023-12-08 ENCOUNTER — OFFICE VISIT (OUTPATIENT)
Dept: URGENT CARE | Facility: URGENT CARE | Age: 33
End: 2023-12-08
Payer: COMMERCIAL

## 2023-12-08 VITALS
TEMPERATURE: 97.8 F | HEART RATE: 75 BPM | DIASTOLIC BLOOD PRESSURE: 82 MMHG | RESPIRATION RATE: 16 BRPM | OXYGEN SATURATION: 100 % | SYSTOLIC BLOOD PRESSURE: 136 MMHG

## 2023-12-08 DIAGNOSIS — N76.0 BV (BACTERIAL VAGINOSIS): ICD-10-CM

## 2023-12-08 DIAGNOSIS — A59.00 GU INFECTION, TRICHOMONAL: ICD-10-CM

## 2023-12-08 DIAGNOSIS — B96.89 BV (BACTERIAL VAGINOSIS): ICD-10-CM

## 2023-12-08 DIAGNOSIS — N89.8 VAGINAL ODOR: Primary | ICD-10-CM

## 2023-12-08 LAB
CLUE CELLS: PRESENT
TRICHOMONAS, WET PREP: PRESENT
WBC'S/HIGH POWER FIELD, WET PREP: ABNORMAL
YEAST, WET PREP: ABNORMAL

## 2023-12-08 PROCEDURE — 99203 OFFICE O/P NEW LOW 30 MIN: CPT | Performed by: NURSE PRACTITIONER

## 2023-12-08 PROCEDURE — 87210 SMEAR WET MOUNT SALINE/INK: CPT | Performed by: NURSE PRACTITIONER

## 2023-12-08 RX ORDER — METRONIDAZOLE 500 MG/1
500 TABLET ORAL 2 TIMES DAILY
Qty: 14 TABLET | Refills: 0 | Status: SHIPPED | OUTPATIENT
Start: 2023-12-08 | End: 2023-12-15

## 2023-12-08 NOTE — PROGRESS NOTES
Assessment & Plan     Vaginal odor  - Wet prep - Clinic Collect    BV (bacterial vaginosis)  - metroNIDAZOLE (FLAGYL) 500 MG tablet  Dispense: 14 tablet; Refill: 0     infection, trichomonal  - metroNIDAZOLE (FLAGYL) 500 MG tablet  Dispense: 14 tablet; Refill: 0           Return in about 1 week (around 12/15/2023) for with regular provider if symptoms persist.    Carrie ORQUIDEA Florez CNP  [facility] ARPITA Steen is a 33 year old female who presents to clinic today for the following health issues:  Chief Complaint   Patient presents with    Vaginal Problem     Vaginal irritation X 2 weeks      HPI      GYN Complaint    Onset of symptoms was 1 month(s) ago.  Course of illness is waxing and waning.    Severity moderate  Current and Associated symptoms: STD exposure, vaginal discharge described as white and yellow, and vulvar itching  Treatment measures tried include:  None  Sexually active: yes, multiple partners, contraception - unsure  Predisposing factors: multiple sexual partners  Hx of previous symptom: rare    Review of Systems  Constitutional, HEENT, cardiovascular, pulmonary, GI, , musculoskeletal, neuro, skin, endocrine and psych systems are negative, except as otherwise noted.      Objective    /82   Pulse 75   Temp 97.8  F (36.6  C) (Tympanic)   Resp 16   SpO2 100%   Physical Exam   GENERAL: healthy, alert and no distress  RESP: lungs clear to auscultation - no rales, rhonchi or wheezes  CV: regular rate and rhythm, normal S1 S2, no S3 or S4, no murmur, click or rub, no peripheral edema and peripheral pulses strong  ABDOMEN: soft, nontender, no hepatosplenomegaly, no masses and bowel sounds normal  MS: no gross musculoskeletal defects noted, no edema  BACK: no CVA tenderness, no paralumbar tenderness

## 2023-12-08 NOTE — PATIENT INSTRUCTIONS
Results for orders placed or performed in visit on 12/08/23   Wet prep - Clinic Collect     Status: Abnormal    Specimen: Vagina; Swab   Result Value Ref Range    Trichomonas Present (A) Absent    Yeast Absent Absent    Clue Cells Present (A) Absent    WBCs/high power field 3+ (A) None